# Patient Record
Sex: MALE | Race: WHITE | NOT HISPANIC OR LATINO | Employment: FULL TIME | URBAN - METROPOLITAN AREA
[De-identification: names, ages, dates, MRNs, and addresses within clinical notes are randomized per-mention and may not be internally consistent; named-entity substitution may affect disease eponyms.]

---

## 2024-03-19 ENCOUNTER — OFFICE VISIT (OUTPATIENT)
Dept: UROLOGY | Facility: CLINIC | Age: 39
End: 2024-03-19
Payer: COMMERCIAL

## 2024-03-19 VITALS
BODY MASS INDEX: 26.15 KG/M2 | HEART RATE: 85 BPM | HEIGHT: 74 IN | DIASTOLIC BLOOD PRESSURE: 93 MMHG | SYSTOLIC BLOOD PRESSURE: 136 MMHG | WEIGHT: 203.8 LBS

## 2024-03-19 DIAGNOSIS — Z30.09 STERILIZATION CONSULT: ICD-10-CM

## 2024-03-19 DIAGNOSIS — Z30.2 STERILIZATION: Primary | ICD-10-CM

## 2024-03-19 RX ORDER — DIAZEPAM 5 MG/1
5 TABLET ORAL ONCE
Qty: 1 TABLET | Refills: 0 | Status: SHIPPED | OUTPATIENT
Start: 2024-03-19 | End: 2024-03-19

## 2024-03-19 NOTE — PROGRESS NOTES
"Chief Complaint  Sterilization (VASECTOMY CONSULT)    Subjective  no acute distress        Yong Alarcon presents to [facility]  History of Present Illness  38-year-old white male who is  and has 2 daughters desire to have bilateral vasectomy.  Patient is not a bleeder.  Patient vapes and smokes almost half to 1 pack a week.  Patient has had previous operations.  Objective no acute distress  Vital Signs:   /93 (BP Location: Left arm, Patient Position: Sitting, Cuff Size: Adult)   Pulse 85   Ht 188 cm (74\")   Wt 92.4 kg (203 lb 12.8 oz)   BMI 26.17 kg/m²     No Known Allergies   Past medical history:  has a past medical history of Hypertension.   Past surgical history:  has a past surgical history that includes Foot surgery.  Personal history: family history includes Hypertension in his father.  Social history:  reports that he has never smoked. He has never used smokeless tobacco. Alcohol use questions deferred to the physician. Drug use questions deferred to the physician.    Review of Systems    Please see past medical and surgical history and rest of the system is negative.    Physical Exam  Constitutional:       General: He is not in acute distress.     Appearance: Normal appearance. He is normal weight. He is not ill-appearing or toxic-appearing.   HENT:      Head: Normocephalic and atraumatic.      Ears:      Comments: No loss of hearing  Genitourinary:     Penis: Normal.       Testes: Normal.      Comments: I cannot feel both vases  Musculoskeletal:         General: No swelling. Normal range of motion.   Skin:     General: Skin is warm.      Coloration: Skin is not jaundiced.   Neurological:      General: No focal deficit present.      Mental Status: He is alert and oriented to person, place, and time.      Motor: No weakness.      Gait: Gait normal.   Psychiatric:         Mood and Affect: Mood normal.         Behavior: Behavior normal.         Thought Content: " Thought content normal.         Judgment: Judgment normal.        Result Review :                 Assessment and Plan    Diagnoses and all orders for this visit:    1. Sterilization consult (Primary)    Patient is a decent candidate for bilateral vasectomy.  Risks benefits and alternate treatment has been discussed especially infection bleeding and injury to surrounding area which includes injury to spermatic cord testicle and scrotum     Brief Urine Lab Results       None             Follow Up   No follow-ups on file.  Patient was given instructions and counseling regarding his condition or for health maintenance advice. Please see specific information pulled into the AVS if appropriate.     Riccardo Quiroga MD

## 2024-03-21 ENCOUNTER — PROCEDURE VISIT (OUTPATIENT)
Dept: UROLOGY | Facility: CLINIC | Age: 39
End: 2024-03-21
Payer: COMMERCIAL

## 2024-03-21 DIAGNOSIS — Z30.2 STERILIZATION: Primary | ICD-10-CM

## 2024-03-21 NOTE — PROGRESS NOTES
Patient was placed in lithotomy position.  Thorough scrubbing her lower abdomen and external genitalia was performed.  Patient was draped in a sterile fashion.  We used 1% Xylocaine with epinephrine and 0.2% ropivacaine equal amounts and injected in the midline of scrotum.  Chinese technique was used and a nick was made in the mid scrotum with the forceps.  Right vas was grasped with a clamp and using cautery it was isolated from the blood vessels and about 2.5 cm of the vas was removed and the edges were coagulated.    Same thing was done on the left side.  Hemostasis was acquired.  Dermabond was used to seal the incision.  Steri-Strips were applied.  Blood loss is less than 1 mL and local anesthesia was used was 24 mL  patient was sent home in good condition.    We also used Pronox on the patient for analgesia.

## 2024-03-21 NOTE — PATIENT INSTRUCTIONS
Instructions    The patient is to go immediately home and lie down flat on his back with an ice pack on the scrotum. He may shower in the morning but no immersion in water for 4 days. He is to avoid strenuous activity for 3 days and straddle activity for 3 weeks. He is reminded that he is not yet sterile and must use contraception until we confirm he no longer has sperm in a semen specimen to be brought to the office in 6 weeks. He is to call for problems.  The patient will schedule a vasectomy if he desires  to proceed.  Handouts were provided.  Electronically Identified Patient Education Materials provided.    All risks, benefits and alternatives to vasectomy discussed and understood. Understanding that this is considered an irreversible procedure. Written consent was obtained. Verbal and written instructions and information were provided. Schedule procedure when patient desires.

## 2024-05-07 ENCOUNTER — LAB (OUTPATIENT)
Dept: UROLOGY | Facility: CLINIC | Age: 39
End: 2024-05-07
Payer: COMMERCIAL

## 2024-05-07 DIAGNOSIS — Z30.8 POSTVASECTOMY SPERM COUNT: Primary | ICD-10-CM

## 2024-07-15 ENCOUNTER — LAB (OUTPATIENT)
Dept: UROLOGY | Facility: CLINIC | Age: 39
End: 2024-07-15
Payer: COMMERCIAL

## 2024-07-15 ENCOUNTER — TELEPHONE (OUTPATIENT)
Dept: UROLOGY | Facility: CLINIC | Age: 39
End: 2024-07-15
Payer: COMMERCIAL

## 2024-07-15 DIAGNOSIS — Z30.8 POSTVASECTOMY SPERM COUNT: Primary | ICD-10-CM

## 2025-03-14 ENCOUNTER — OFFICE VISIT (OUTPATIENT)
Dept: FAMILY MEDICINE CLINIC | Facility: CLINIC | Age: 40
End: 2025-03-14
Payer: COMMERCIAL

## 2025-03-14 VITALS
BODY MASS INDEX: 26.46 KG/M2 | SYSTOLIC BLOOD PRESSURE: 146 MMHG | TEMPERATURE: 97.7 F | HEIGHT: 74 IN | HEART RATE: 101 BPM | OXYGEN SATURATION: 99 % | WEIGHT: 206.2 LBS | DIASTOLIC BLOOD PRESSURE: 100 MMHG

## 2025-03-14 DIAGNOSIS — Z00.00 HEALTHCARE MAINTENANCE: ICD-10-CM

## 2025-03-14 DIAGNOSIS — S50.311A ABRASION OF RIGHT ELBOW, INITIAL ENCOUNTER: ICD-10-CM

## 2025-03-14 DIAGNOSIS — Z76.89 ENCOUNTER TO ESTABLISH CARE: ICD-10-CM

## 2025-03-14 DIAGNOSIS — Z71.6 ENCOUNTER FOR SMOKING CESSATION COUNSELING: ICD-10-CM

## 2025-03-14 DIAGNOSIS — Z23 IMMUNIZATION DUE: ICD-10-CM

## 2025-03-14 DIAGNOSIS — R20.2 PARESTHESIA: ICD-10-CM

## 2025-03-14 DIAGNOSIS — I10 PRIMARY HYPERTENSION: Primary | ICD-10-CM

## 2025-03-14 PROCEDURE — 99406 BEHAV CHNG SMOKING 3-10 MIN: CPT

## 2025-03-14 PROCEDURE — 90715 TDAP VACCINE 7 YRS/> IM: CPT

## 2025-03-14 PROCEDURE — 90471 IMMUNIZATION ADMIN: CPT

## 2025-03-14 PROCEDURE — 99204 OFFICE O/P NEW MOD 45 MIN: CPT

## 2025-03-14 RX ORDER — LISINOPRIL 10 MG/1
10 TABLET ORAL DAILY
COMMUNITY
End: 2025-03-14 | Stop reason: SDUPTHER

## 2025-03-14 RX ORDER — LISINOPRIL 10 MG/1
10 TABLET ORAL DAILY
Qty: 30 TABLET | Refills: 2 | Status: SHIPPED | OUTPATIENT
Start: 2025-03-14

## 2025-03-14 RX ORDER — MUPIROCIN 20 MG/G
1 OINTMENT TOPICAL 2 TIMES DAILY
Qty: 15 G | Refills: 0 | Status: SHIPPED | OUTPATIENT
Start: 2025-03-14

## 2025-03-24 PROBLEM — Z00.00 HEALTHCARE MAINTENANCE: Status: ACTIVE | Noted: 2025-03-24

## 2025-03-24 PROBLEM — R20.2 PARESTHESIA: Status: ACTIVE | Noted: 2025-03-24

## 2025-03-24 PROBLEM — Z71.6 ENCOUNTER FOR SMOKING CESSATION COUNSELING: Status: ACTIVE | Noted: 2025-03-24

## 2025-03-24 PROBLEM — I10 PRIMARY HYPERTENSION: Status: ACTIVE | Noted: 2025-03-24

## 2025-03-24 PROBLEM — S50.311A ABRASION OF RIGHT ELBOW: Status: ACTIVE | Noted: 2025-03-24

## 2025-03-25 NOTE — ASSESSMENT & PLAN NOTE
He will receive a tetanus vaccine today. He is advised to get the pneumonia, COVID, and influenza vaccines.

## 2025-03-25 NOTE — ASSESSMENT & PLAN NOTE
Cleansed wound with normal saline and applied bacitracin, nonadherent gauze, and wrapped with coban. He is advised to apply mupirocin ointment twice daily and keep the area covered until it scabs over. He should monitor for signs of infection such as redness, yellow drainage, or foul odor.

## 2025-03-25 NOTE — ASSESSMENT & PLAN NOTE
He reports tingling in his feet, which may be a result of previous surgeries. A B12 level test will be ordered to rule out deficiency as a cause.

## 2025-03-25 NOTE — ASSESSMENT & PLAN NOTE
Hypertension is uncontrolled  Dietary sodium restriction.  Weight loss.  Regular aerobic exercise.  Stop smoking.  Ambulatory blood pressure monitoring.  Restart lisinopril 10 mg daily.   Blood pressure will be reassessedin 2 weeks.

## 2025-03-25 NOTE — ASSESSMENT & PLAN NOTE
Offered Wellbutrin but he declined for now. Would like more information to think about it. He is provided with information about Wellbutrin to AVS as a potential aid in smoking cessation. He is also informed about the availability of nicotine patches, gums, and lozenges.    Yong Ponce  reports that he has been smoking cigarettes. He started smoking about 22 years ago. He has a 15 pack-year smoking history. He has never used smokeless tobacco. I have educated him on the risk of diseases from using tobacco products such as cancer, COPD, and heart disease.     I advised him to quit and he is willing to quit. We have discussed the following method/s for tobacco cessation:  Education Material and OTC Cessation Products.      I spent 3  minutes counseling the patient.

## 2025-03-27 ENCOUNTER — OFFICE VISIT (OUTPATIENT)
Dept: FAMILY MEDICINE CLINIC | Facility: CLINIC | Age: 40
End: 2025-03-27
Payer: COMMERCIAL

## 2025-03-27 VITALS
OXYGEN SATURATION: 98 % | WEIGHT: 215.8 LBS | TEMPERATURE: 97.7 F | BODY MASS INDEX: 27.7 KG/M2 | DIASTOLIC BLOOD PRESSURE: 90 MMHG | HEIGHT: 74 IN | HEART RATE: 80 BPM | SYSTOLIC BLOOD PRESSURE: 120 MMHG

## 2025-03-27 DIAGNOSIS — H65.191 ACUTE MEE (MIDDLE EAR EFFUSION), RIGHT: ICD-10-CM

## 2025-03-27 DIAGNOSIS — H93.8X3: ICD-10-CM

## 2025-03-27 DIAGNOSIS — I10 PRIMARY HYPERTENSION: Primary | ICD-10-CM

## 2025-03-27 PROCEDURE — 99214 OFFICE O/P EST MOD 30 MIN: CPT

## 2025-03-27 RX ORDER — FLUTICASONE PROPIONATE 50 MCG
2 SPRAY, SUSPENSION (ML) NASAL DAILY
Qty: 9.9 G | Refills: 0 | Status: SHIPPED | OUTPATIENT
Start: 2025-03-27

## 2025-03-27 RX ORDER — CETIRIZINE HYDROCHLORIDE 10 MG/1
10 TABLET ORAL DAILY
Qty: 30 TABLET | Refills: 2 | Status: SHIPPED | OUTPATIENT
Start: 2025-03-27

## 2025-03-27 NOTE — PROGRESS NOTES
Chief Complaint  Hypertension (2 week follow up) and Labs  (Fasting )    Subjective          Yong Ponce presents to [facility]                History of Present Illness  The patient is a 39-year-old male who presents today to follow up on hypertension and right ear hearing impairment.    I last saw him on 3/14/2025 and lisinopril 10 mg was restarted.  He has been diligently maintaining a home blood pressure log, which reveals fluctuating readings that appear to be stabilizing. His diastolic readings have not exceeded 90, while his systolic readings frequently register at 112. He reports no headaches, chest pain, shortness of breath, peripheral edema, or visual disturbances.  Blood pressure today in office is 120/90.    He reports intermittent hearing loss in his right ear, which he first noticed during a strep infection approximately 3 months ago. The symptoms recurred yesterday. He describes the sensation as akin to wind blowing in his ear, without any associated pain or fullness. He occasionally uses ear plugs at work when exposed to loud noises.  He states he has been told he has polyps on the inside of his ears, he has not sought consultation with an otolaryngologist for these masses. He has not experienced any recent allergies or nasal congestion. He has not observed any otorrhea. He recalls using ear drops during his strep infection, which seemed to alleviate the symptoms.  Hypertension  This is a recurrent problem. The current episode started more than 1 year ago. The problem is unchanged. Associated symptoms include anxiety, headaches and malaise/fatigue. Pertinent negatives include no blurred vision, chest pain, orthopnea, palpitations, peripheral edema or shortness of breath. There are no associated agents to hypertension. There are no compliance problems.          Objective   Vital Signs:  /90 (BP Location: Left arm, Patient Position: Sitting, Cuff Size: Adult)    "Pulse 80   Temp 97.7 °F (36.5 °C) (Infrared)   Ht 188 cm (74\")   Wt 97.9 kg (215 lb 12.8 oz)   SpO2 98%   BMI 27.71 kg/m²   Estimated body mass index is 27.71 kg/m² as calculated from the following:    Height as of this encounter: 188 cm (74\").    Weight as of this encounter: 97.9 kg (215 lb 12.8 oz).          Physical Exam  Constitutional:       Appearance: Normal appearance.   HENT:      Head: Normocephalic.      Right Ear: A middle ear effusion is present.      Left Ear: A middle ear effusion is present.      Ears:      Comments: Bilateral middle ear masses  Cardiovascular:      Rate and Rhythm: Normal rate and regular rhythm.      Heart sounds: Normal heart sounds.   Pulmonary:      Effort: Pulmonary effort is normal.      Breath sounds: Normal breath sounds.   Musculoskeletal:      Cervical back: Neck supple.      Right lower leg: No edema.      Left lower leg: No edema.   Neurological:      Mental Status: He is alert and oriented to person, place, and time.   Psychiatric:         Mood and Affect: Mood normal.        Result Review :  The following data was reviewed by: GENESIS Chinchilla on 03/27/2025:  Common labs          3/27/2025    10:17   Common Labs   Glucose 87    BUN 11    Creatinine 0.78    Sodium 138    Potassium 5.0    Chloride 103    Calcium 9.4    Albumin 4.6    Total Bilirubin 0.5    Alkaline Phosphatase 79    AST (SGOT) 15    ALT (SGPT) 19    WBC 7.18    Hemoglobin 16.1    Hematocrit 48.0    Platelets 380    Total Cholesterol 162    Triglycerides 199    HDL Cholesterol 55    LDL Cholesterol  74                Assessment and Plan   Diagnoses and all orders for this visit:    1. Primary hypertension (Primary)  Assessment & Plan:  Hypertension is stable and controlled  Continue current treatment regimen.  Dietary sodium restriction.  Weight loss.  Regular aerobic exercise.  Stop smoking.  Ambulatory blood pressure monitoring.  Blood pressure will be reassessed in 3 months.      2. Acute CHANDLER " (middle ear effusion), right  -     fluticasone (FLONASE) 50 MCG/ACT nasal spray; Administer 2 sprays into the nostril(s) as directed by provider Daily.  Dispense: 9.9 g; Refill: 0  -     cetirizine (zyrTEC) 10 MG tablet; Take 1 tablet by mouth Daily.  Dispense: 30 tablet; Refill: 2    3. Mass of both ear canals  -     Ambulatory Referral to ENT (Otolaryngology)           Patient agrees with plan of care and understands instructions. Call if worsening symptoms or any problems or concerns.     EMR Dragon/Transcription Disclaimer:  Much of this encounter note is an electronic transcription/translation of spoken language to printed text.  The electronic translation of spoken language may permit erroneous, or at times, nonsensical words or phrases to be inadvertently transcribed; Although I have reviewed the note for such errors, some may still exist.              Follow Up   Return in about 3 months (around 6/27/2025) for Annual physical.  Patient was given instructions and counseling regarding his condition or for health maintenance advice. Please see specific information pulled into the AVS if appropriate.     Patient or patient representative verbalized consent for the use of Ambient Listening during the visit with  GENESIS Chinchilla for chart documentation. 3/27/2025  09:50 EDT

## 2025-03-28 PROBLEM — H65.191 ACUTE MEE (MIDDLE EAR EFFUSION), RIGHT: Status: ACTIVE | Noted: 2025-03-28

## 2025-08-14 DIAGNOSIS — I10 PRIMARY HYPERTENSION: ICD-10-CM

## 2025-08-15 RX ORDER — LISINOPRIL 10 MG/1
10 TABLET ORAL DAILY
Qty: 90 TABLET | Refills: 1 | Status: SHIPPED | OUTPATIENT
Start: 2025-08-15